# Patient Record
Sex: MALE | Race: WHITE | URBAN - METROPOLITAN AREA
[De-identification: names, ages, dates, MRNs, and addresses within clinical notes are randomized per-mention and may not be internally consistent; named-entity substitution may affect disease eponyms.]

---

## 2023-10-02 ENCOUNTER — APPOINTMENT (OUTPATIENT)
Age: 18
End: 2023-10-02

## 2023-10-02 ENCOUNTER — OFFICE VISIT (OUTPATIENT)
Age: 18
End: 2023-10-02

## 2023-10-02 VITALS
HEIGHT: 70 IN | RESPIRATION RATE: 18 BRPM | DIASTOLIC BLOOD PRESSURE: 76 MMHG | OXYGEN SATURATION: 98 % | BODY MASS INDEX: 27.2 KG/M2 | WEIGHT: 190 LBS | HEART RATE: 51 BPM | SYSTOLIC BLOOD PRESSURE: 122 MMHG

## 2023-10-02 DIAGNOSIS — S93.401A SPRAIN OF RIGHT ANKLE, UNSPECIFIED LIGAMENT, INITIAL ENCOUNTER: Primary | ICD-10-CM
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
## 2023-10-02 DIAGNOSIS — M25.571 RIGHT ANKLE PAIN, UNSPECIFIED CHRONICITY: ICD-10-CM

## 2023-10-02 PROCEDURE — G0382 LEV 3 HOSP TYPE B ED VISIT: HCPCS | Performed by: PHYSICIAN ASSISTANT

## 2023-10-02 PROCEDURE — 73610 X-RAY EXAM OF ANKLE: CPT

## 2023-10-02 NOTE — PATIENT INSTRUCTIONS
Ankle Sprain   WHAT YOU NEED TO KNOW:   An ankle sprain happens when 1 or more ligaments in your ankle joint stretch or tear. Ligaments are tough tissues that connect bones. Ligaments support your joints and keep your bones in place. DISCHARGE INSTRUCTIONS:   Return to the emergency department if:   You have severe pain in your ankle. Your foot or toes are cold or numb. Your ankle becomes more weak or unstable (wobbly). You are unable to put any weight on your ankle or foot. Your swelling has increased or returned. Call your doctor if:   Your pain does not go away, even after treatment. You have questions or concerns about your condition or care. Medicines: You may need any of the following:  NSAIDs , such as ibuprofen, help decrease swelling, pain, and fever. This medicine is available with or without a doctor's order. NSAIDs can cause stomach bleeding or kidney problems in certain people. If you take blood thinner medicine, always ask your healthcare provider if NSAIDs are safe for you. Always read the medicine label and follow directions. Acetaminophen  decreases pain and fever. It is available without a doctor's order. Ask how much to take and how often to take it. Follow directions. Read the labels of all other medicines you are using to see if they also contain acetaminophen, or ask your doctor or pharmacist. Acetaminophen can cause liver damage if not taken correctly. Prescription pain medicine  may be given. Ask your healthcare provider how to take this medicine safely. Some prescription pain medicines contain acetaminophen. Do not take other medicines that contain acetaminophen without talking to your healthcare provider. Too much acetaminophen may cause liver damage. Prescription pain medicine may cause constipation. Ask your healthcare provider how to prevent or treat constipation. Take your medicine as directed.   Contact your healthcare provider if you think your medicine is not helping or if you have side effects. Tell your provider if you are allergic to any medicine. Keep a list of the medicines, vitamins, and herbs you take. Include the amounts, and when and why you take them. Bring the list or the pill bottles to follow-up visits. Carry your medicine list with you in case of an emergency. Self-care:   Use support devices , such as a brace, cast, or splint, to limit your movement and protect your joint. You may need to use crutches to decrease your pain as you move around. Go to physical therapy  as directed. A physical therapist teaches you exercises to help improve movement and strength, and to decrease pain. Rest  your ankle so that it can heal. Return to normal activities as directed. Apply ice  on your ankle for 15 to 20 minutes every hour or as directed. Use an ice pack, or put crushed ice in a plastic bag. Cover the ice pack or bag with a towel before you put it on your injury. Ice helps prevent tissue damage and decreases swelling and pain. Compress  your ankle. Ask if you should wrap an elastic bandage around your injured ligament. An elastic bandage provides support and helps decrease swelling and movement so your joint can heal. Wear as long as directed. Elevate  your ankle above the level of your heart as often as you can. This will help decrease swelling and pain. Prop your ankle on pillows or blankets to keep it elevated comfortably. Prevent another ankle sprain:   Let your ankle heal.  Find out how long your ligament needs to heal. Do not do any physical activity until your healthcare provider says it is okay. If you start activity too soon, you may develop a more serious injury. Warm up and stretch before you exercise or play sports. This helps your joints become strong and flexible. Use the right equipment. Always wear shoes that fit well and are made for the activity that you are doing.  You may also need ankle supports, elbow and knee pads, or braces. Follow up with your doctor as directed:  Write down your questions so you remember to ask them during your visits. © Copyright Amilcar Mclain 2023 Information is for End User's use only and may not be sold, redistributed or otherwise used for commercial purposes. The above information is an  only. It is not intended as medical advice for individual conditions or treatments. Talk to your doctor, nurse or pharmacist before following any medical regimen to see if it is safe and effective for you. Ankle Exercises   AMBULATORY CARE:   What you need to know about ankle exercises: Ankle exercises help strengthen your ankle and improve its function after injury. These are beginning exercises. Ask your healthcare provider if you need to see a physical therapist for more advanced exercises. General guidelines for ankle exercises:   Do these exercises 3 to 5 days a week, or as directed by your healthcare provider. Ask if you should do the exercises on each ankle. Do the exercises in the order that your healthcare provider recommends. This will help prevent swelling, chronic pain, and reinjury. Start with range of motion exercises. Then move to strengthening exercises, and finally to balancing exercises. Warm up before you do ankle exercises. Walk or ride a stationary bike for 5 to 10 minutes to prepare your ankle for movement. Stop if you feel pain. It is normal to feel some discomfort at first but you should not feel pain. Tell your doctor or physical therapist if you have pain while you exercise. Regular exercise will help decrease your discomfort over time. How to perform range of motion exercises safely:  Begin with range of motion exercises to improve flexibility. Ask your healthcare provider when you can progress to strengthening exercises. Ankle alphabet:  Sit on a chair so that your feet do not touch the floor.  Use your big toe to write each letter of the alphabet. Use only your foot and ankle, and keep your movements small. Do 2 sets. Calf stretches:      Sitting calf stretches with a towel:  Sit on the floor with both legs out straight in front of you. Loop a towel around the ball of your injured foot. Grasp the ends of the towel and pull it toward you. Keep your leg and back straight. Do not lean forward as you pull the towel. Hold for 30 seconds. Then relax for 30 seconds. Do 2 sets of 10. Standing calf stretches:  Stand facing a wall with the foot that is not injured forward and your knee slightly bent. Keep the leg with the injured foot straight and behind you with your toes pointed in slightly. With both heels flat on the floor, press your hips forward. Do not arch your back. Hold for 30 seconds, and then relax for 30 seconds. Do 2 sets of 10. Repeat with your leg bent. Do 2 sets of 10. How to perform strengthening exercises safely:  After you can perform range of motion exercises without pain, you may begin strengthening exercises. Ask your healthcare provider when you can progress to balancing exercises. Ankle movement in 4 directions:  Sit on the floor with your legs straight in front of you. Keep your heels on the floor for support. Dorsiflexion:  Begin with your toes pointing straight up. Pull your toes toward your body. Slowly return to the starting position. Do 3 sets of 5. Plantar flexion:  Begin with your toes pointing straight up. Push your toes away from your body. Slowly return to the starting position. Do 3 sets of 5. Inversion:  Begin with your toes pointing straight up. Push your toes inward, toward each other. Slowly return to the starting position. Do 3 sets of 5. Eversion:  Begin with your toes pointing straight up. Push your toes outward, away from each other. Slowly return to the starting position. Do 3 sets of 5.        Toe curls with a towel:  Sit on a chair so that both of your feet are flat on the floor. Place a small towel on the floor in front of your injured foot. Grab the center of the towel with your toes and curl the towel toward you. Relax and repeat. Do 1 set of 5. Carlotta pick-ups:  Sit on a chair so that both of your feet are flat on the floor. Place 20 marbles on the floor in front of your injured foot. Use your toes to  one marble at a time and place it into a bowl. Repeat until you have picked up all the marbles. Do 1 set. Heel raises:      Single leg heel raises:  Stand with your weight evenly on both feet. Hold on to a chair or a wall for balance. Lift the foot that is not injured off the floor so all your weight is placed on your injured foot. Raise the heel of your injured foot as high as you can. Slowly lower your heel to the floor. Do 1 set of 10. Double leg heel raises:  Stand with your weight evenly on both feet. Hold on to a chair or a wall for balance. Raise both of your heels as high as you can. Slowly lower your heels to the floor. Do 1 set of 10. Heel and toe walks:      Heel walks:  Begin in a standing position. Lift your toes off the floor and walk on your heels. Keep your toes lifted as high as possible. Do 2 sets of 10. Toe walks:  Begin in a standing position. Lift your heels off the floor and walk on the balls and toes of your feet. Keep your heels lifted as high as possible. Do 2 sets of 10. How to perform a balance exercise safely:  After you can perform strengthening exercises without pain, you may do this beginning balancing exercise. Ask your healthcare provider for more advanced balance exercises. Single leg stance:  Stand with your weight evenly on both feet, or hold on to a chair or a wall. Do not lean to the side. Lift the foot that is not injured off the floor so all your weight is placed on your injured foot. Balance on your injured foot. Ask your healthcare provider how long to hold this position.            Call your doctor or physical therapist if:   You have new pain, or your pain becomes worse. You have questions or concerns about your condition, care, or exercise program.    © Copyright Graciela Loges 2023 Information is for End User's use only and may not be sold, redistributed or otherwise used for commercial purposes. The above information is an  only. It is not intended as medical advice for individual conditions or treatments. Talk to your doctor, nurse or pharmacist before following any medical regimen to see if it is safe and effective for you.

## 2023-10-02 NOTE — LETTER
October 2, 2023     Patient: Neil Oppenheim   YOB: 2005   Date of Visit: 10/2/2023       To Whom it May Concern:    Neil Oppenheim was seen in my clinic on 10/2/2023. He may return to school on 10/3/2023 . If you have any questions or concerns, please don't hesitate to call.          Sincerely,          Dane Esteves PA-C        CC: No Recipients

## 2023-10-06 NOTE — PROGRESS NOTES
[facility]        NAME: Halie Faust is a 19 y.o. male  : 2005    MRN: [id number]  DATE: 2023  TIME: 2:17 PM    Assessment and Plan   Sprain of right ankle, unspecified ligament, initial encounter [S93.401A]  1. Sprain of right ankle, unspecified ligament, initial encounter  XR ankle 3+ vw right    Ambulatory Referral to Orthopedic Surgery      2. Injury caused by twisting with sudden strenuous movement, initial encounter  Ambulatory Referral to Orthopedic Surgery            Patient Instructions     Patient has suffered right ankle sprain due to twisting injury. X-rays negative for fracture or dislocation. He was given an Ace wrap and recommended RICE, NSAIDs, avoiding any significant physical strenuous activity involving the right foot and ankle for at least the next week. An order was placed in the system for Ortho consult if condition does not significantly improve over the next 1 to 2 weeks. Follow up with PCP in 3-5 days. Proceed to  ER if symptoms worsen. Chief Complaint     Chief Complaint   Patient presents with   • Ankle Injury     Ankle sprain about a week ago while boxing, he stepped back and felt a pop. Pt was managing the pain at home but it has not gotten any better. History of Present Illness       Patient presents after suffering injury to his right ankle which occurred 1 week ago. He reports that he was boxing and he stepped back with his right foot as his back foot and felt a pop immediately followed by pain. Pain is worse with walking and weightbearing as well as certain activities. Has been managing conservatively since the injury occurred without significant relief or improvement. Ankle Injury         Review of Systems   Review of Systems   Constitutional: Negative. Respiratory: Negative. Cardiovascular: Negative. Gastrointestinal: Negative. Genitourinary: Negative.     Musculoskeletal:        Right ankle pain status post injury Neurological: Negative. Current Medications     No current outpatient medications on file. Current Allergies     Allergies as of 10/02/2023   • (No Known Allergies)            The following portions of the patient's history were reviewed and updated as appropriate: allergies, current medications, past family history, past medical history, past social history, past surgical history and problem list.     History reviewed. No pertinent past medical history. History reviewed. No pertinent surgical history. History reviewed. No pertinent family history. Medications have been verified. Objective   /76   Pulse (!) 51   Resp 18   Ht 5' 10" (1.778 m)   Wt 86.2 kg (190 lb)   SpO2 98%   BMI 27.26 kg/m²   No LMP for male patient. Physical Exam     Physical Exam  Vitals reviewed. Constitutional:       General: He is not in acute distress. Appearance: He is well-developed. Musculoskeletal:      Comments: Localized tenderness to palpation and soft tissue swelling right ankle over the lateral malleolus worsened with passive range of motion especially with varus stress. Range of motion is overall intact. There is no ecchymosis or significant deformity noted. No sign of instability. Drawer sign is negative. Right foot exam is overall benign. Neurological:      Mental Status: He is alert and oriented to person, place, and time. Sensory: No sensory deficit.